# Patient Record
Sex: FEMALE | Race: OTHER | HISPANIC OR LATINO | URBAN - METROPOLITAN AREA
[De-identification: names, ages, dates, MRNs, and addresses within clinical notes are randomized per-mention and may not be internally consistent; named-entity substitution may affect disease eponyms.]

---

## 2017-01-29 ENCOUNTER — EMERGENCY (EMERGENCY)
Facility: HOSPITAL | Age: 27
LOS: 1 days | Discharge: HOME | End: 2017-01-29
Attending: EMERGENCY MEDICINE
Payer: COMMERCIAL

## 2017-01-29 VITALS
RESPIRATION RATE: 16 BRPM | DIASTOLIC BLOOD PRESSURE: 71 MMHG | HEART RATE: 65 BPM | TEMPERATURE: 98 F | OXYGEN SATURATION: 98 % | SYSTOLIC BLOOD PRESSURE: 107 MMHG

## 2017-01-29 VITALS
HEART RATE: 86 BPM | OXYGEN SATURATION: 100 % | RESPIRATION RATE: 16 BRPM | DIASTOLIC BLOOD PRESSURE: 71 MMHG | HEIGHT: 65 IN | WEIGHT: 220.02 LBS | TEMPERATURE: 98 F | SYSTOLIC BLOOD PRESSURE: 130 MMHG

## 2017-01-29 DIAGNOSIS — R07.89 OTHER CHEST PAIN: ICD-10-CM

## 2017-01-29 DIAGNOSIS — Z88.2 ALLERGY STATUS TO SULFONAMIDES: ICD-10-CM

## 2017-01-29 PROCEDURE — 93005 ELECTROCARDIOGRAM TRACING: CPT

## 2017-01-29 PROCEDURE — 99284 EMERGENCY DEPT VISIT MOD MDM: CPT

## 2017-01-29 PROCEDURE — 99283 EMERGENCY DEPT VISIT LOW MDM: CPT

## 2017-01-29 RX ADMIN — Medication 1 MILLIGRAM(S): at 19:39

## 2017-01-29 NOTE — ED ADULT NURSE NOTE - OBJECTIVE STATEMENT
received pt from triage with c/o chest tightness. pt is a/o x 3, cooperative. Denies SOB. Awaiting to be seen by MD. will continue to monitor.

## 2017-01-29 NOTE — ED PROVIDER NOTE - MEDICAL DECISION MAKING DETAILS
25 y/o F pt p/w anxiety, chest tightness. Plan: give small dose of Ativan and reevaluate. May be worsening of anxiety due to withdrawal from alcohol/caffeine/smoking. 25 y/o F pt p/w anxiety, chest tightness. Plan: give small dose of Ativan and reevaluate. May be worsening of anxiety due to withdrawal from alcohol/caffeine/smoking.  pt has fu appt to talk about going back on ssri. ekg normal  feels better after ativan

## 2017-01-29 NOTE — ED PROVIDER NOTE - NS ED MD SCRIBE ATTENDING SCRIBE SECTIONS
DISPOSITION/PAST MEDICAL/SURGICAL/SOCIAL HISTORY/VITAL SIGNS( Pullset)/HISTORY OF PRESENT ILLNESS/PHYSICAL EXAM/HIV/REVIEW OF SYSTEMS VITAL SIGNS( Pullset)/RESULTS/REVIEW OF SYSTEMS/HIV/DISPOSITION/PAST MEDICAL/SURGICAL/SOCIAL HISTORY/HISTORY OF PRESENT ILLNESS/PHYSICAL EXAM

## 2017-01-29 NOTE — ED PROVIDER NOTE - CHPI ED SYMPTOMS NEG
no fever, no chills, no shortness of breath, no cough, no numbness, no tingling, no dizziness, no headache, no diaphoresis/no suicidal/no hallucinations/no homicidal

## 2018-07-06 NOTE — ED ADULT TRIAGE NOTE - ARRIVAL FROM
07/04/18 0942   Rehab Time/Intention   Evaluation Not Performed other (see comments)  (Per RN, hold PT eval until tomorrow - pt too painful and nauseous at this time.)   Rehab Treatment   Discipline physical therapist   Recommendation   PT - Next Appointment 07/05/18     
   07/06/18 1523   Rehab Treatment   Discipline physical therapist   Reason Treatment Not Performed other (see comments)  (Observed pt. ambulate in hallway around nursing station x 2 this PM with family (use of I.V. pole for stability). Pt. now back in room and at rest. Will follow up over weekend for any further questions/concerns regarding functional mobility.)   Recommendation   PT - Next Appointment 07/08/18     
Heart rhythm change on monitor. Appeared a-fib, ordered STAT EKG per protocol. EKG Showed possible infarct, ordered STAT labs. Spoke with Dr. Carreon 2031 in regards to XR results, stated she would be up to see patient shortly.  
Home

## 2018-11-28 ENCOUNTER — APPOINTMENT (OUTPATIENT)
Dept: DERMATOLOGY | Facility: CLINIC | Age: 28
End: 2018-11-28

## 2018-11-29 ENCOUNTER — APPOINTMENT (OUTPATIENT)
Dept: DERMATOLOGY | Facility: CLINIC | Age: 28
End: 2018-11-29

## 2020-09-27 ENCOUNTER — EMERGENCY (EMERGENCY)
Facility: HOSPITAL | Age: 30
LOS: 1 days | Discharge: HOME | End: 2020-09-27
Attending: EMERGENCY MEDICINE | Admitting: EMERGENCY MEDICINE
Payer: COMMERCIAL

## 2020-09-27 VITALS
HEIGHT: 65 IN | HEART RATE: 78 BPM | OXYGEN SATURATION: 95 % | RESPIRATION RATE: 18 BRPM | DIASTOLIC BLOOD PRESSURE: 84 MMHG | SYSTOLIC BLOOD PRESSURE: 120 MMHG | TEMPERATURE: 98 F | WEIGHT: 293 LBS

## 2020-09-27 VITALS
SYSTOLIC BLOOD PRESSURE: 132 MMHG | DIASTOLIC BLOOD PRESSURE: 76 MMHG | TEMPERATURE: 98 F | RESPIRATION RATE: 18 BRPM | OXYGEN SATURATION: 98 % | HEART RATE: 72 BPM

## 2020-09-27 DIAGNOSIS — R10.31 RIGHT LOWER QUADRANT PAIN: ICD-10-CM

## 2020-09-27 DIAGNOSIS — M54.5 LOW BACK PAIN: ICD-10-CM

## 2020-09-27 DIAGNOSIS — R10.30 LOWER ABDOMINAL PAIN, UNSPECIFIED: ICD-10-CM

## 2020-09-27 DIAGNOSIS — Z88.2 ALLERGY STATUS TO SULFONAMIDES: ICD-10-CM

## 2020-09-27 LAB
ALBUMIN SERPL ELPH-MCNC: 4.2 G/DL — SIGNIFICANT CHANGE UP (ref 3.3–5)
ALP SERPL-CCNC: 89 U/L — SIGNIFICANT CHANGE UP (ref 40–120)
ALT FLD-CCNC: 41 U/L — SIGNIFICANT CHANGE UP (ref 10–45)
ANION GAP SERPL CALC-SCNC: 11 MMOL/L — SIGNIFICANT CHANGE UP (ref 5–17)
APPEARANCE UR: CLEAR — SIGNIFICANT CHANGE UP
AST SERPL-CCNC: 22 U/L — SIGNIFICANT CHANGE UP (ref 10–40)
BASOPHILS # BLD AUTO: 0.04 K/UL — SIGNIFICANT CHANGE UP (ref 0–0.2)
BASOPHILS NFR BLD AUTO: 0.5 % — SIGNIFICANT CHANGE UP (ref 0–2)
BILIRUB SERPL-MCNC: 0.3 MG/DL — SIGNIFICANT CHANGE UP (ref 0.2–1.2)
BILIRUB UR-MCNC: NEGATIVE — SIGNIFICANT CHANGE UP
BUN SERPL-MCNC: 10 MG/DL — SIGNIFICANT CHANGE UP (ref 7–23)
CALCIUM SERPL-MCNC: 9.5 MG/DL — SIGNIFICANT CHANGE UP (ref 8.4–10.5)
CHLORIDE SERPL-SCNC: 100 MMOL/L — SIGNIFICANT CHANGE UP (ref 96–108)
CO2 SERPL-SCNC: 26 MMOL/L — SIGNIFICANT CHANGE UP (ref 22–31)
COLOR SPEC: YELLOW — SIGNIFICANT CHANGE UP
CREAT SERPL-MCNC: 0.69 MG/DL — SIGNIFICANT CHANGE UP (ref 0.5–1.3)
DIFF PNL FLD: NEGATIVE — SIGNIFICANT CHANGE UP
EOSINOPHIL # BLD AUTO: 0.09 K/UL — SIGNIFICANT CHANGE UP (ref 0–0.5)
EOSINOPHIL NFR BLD AUTO: 1 % — SIGNIFICANT CHANGE UP (ref 0–6)
GLUCOSE SERPL-MCNC: 96 MG/DL — SIGNIFICANT CHANGE UP (ref 70–99)
GLUCOSE UR QL: NEGATIVE — SIGNIFICANT CHANGE UP
HCT VFR BLD CALC: 40.3 % — SIGNIFICANT CHANGE UP (ref 34.5–45)
HGB BLD-MCNC: 12.8 G/DL — SIGNIFICANT CHANGE UP (ref 11.5–15.5)
IMM GRANULOCYTES NFR BLD AUTO: 0.3 % — SIGNIFICANT CHANGE UP (ref 0–1.5)
KETONES UR-MCNC: NEGATIVE — SIGNIFICANT CHANGE UP
LEUKOCYTE ESTERASE UR-ACNC: NEGATIVE — SIGNIFICANT CHANGE UP
LYMPHOCYTES # BLD AUTO: 1.65 K/UL — SIGNIFICANT CHANGE UP (ref 1–3.3)
LYMPHOCYTES # BLD AUTO: 19.2 % — SIGNIFICANT CHANGE UP (ref 13–44)
MCHC RBC-ENTMCNC: 27.5 PG — SIGNIFICANT CHANGE UP (ref 27–34)
MCHC RBC-ENTMCNC: 31.8 GM/DL — LOW (ref 32–36)
MCV RBC AUTO: 86.5 FL — SIGNIFICANT CHANGE UP (ref 80–100)
MONOCYTES # BLD AUTO: 0.58 K/UL — SIGNIFICANT CHANGE UP (ref 0–0.9)
MONOCYTES NFR BLD AUTO: 6.7 % — SIGNIFICANT CHANGE UP (ref 2–14)
NEUTROPHILS # BLD AUTO: 6.22 K/UL — SIGNIFICANT CHANGE UP (ref 1.8–7.4)
NEUTROPHILS NFR BLD AUTO: 72.3 % — SIGNIFICANT CHANGE UP (ref 43–77)
NITRITE UR-MCNC: NEGATIVE — SIGNIFICANT CHANGE UP
NRBC # BLD: 0 /100 WBCS — SIGNIFICANT CHANGE UP (ref 0–0)
PH UR: 6 — SIGNIFICANT CHANGE UP (ref 5–8)
PLATELET # BLD AUTO: 249 K/UL — SIGNIFICANT CHANGE UP (ref 150–400)
POTASSIUM SERPL-MCNC: 4.1 MMOL/L — SIGNIFICANT CHANGE UP (ref 3.5–5.3)
POTASSIUM SERPL-SCNC: 4.1 MMOL/L — SIGNIFICANT CHANGE UP (ref 3.5–5.3)
PROT SERPL-MCNC: 7.2 G/DL — SIGNIFICANT CHANGE UP (ref 6–8.3)
PROT UR-MCNC: NEGATIVE MG/DL — SIGNIFICANT CHANGE UP
RBC # BLD: 4.66 M/UL — SIGNIFICANT CHANGE UP (ref 3.8–5.2)
RBC # FLD: 13.7 % — SIGNIFICANT CHANGE UP (ref 10.3–14.5)
SODIUM SERPL-SCNC: 137 MMOL/L — SIGNIFICANT CHANGE UP (ref 135–145)
SP GR SPEC: 1.02 — SIGNIFICANT CHANGE UP (ref 1–1.03)
UROBILINOGEN FLD QL: 0.2 E.U./DL — SIGNIFICANT CHANGE UP
WBC # BLD: 8.61 K/UL — SIGNIFICANT CHANGE UP (ref 3.8–10.5)
WBC # FLD AUTO: 8.61 K/UL — SIGNIFICANT CHANGE UP (ref 3.8–10.5)

## 2020-09-27 PROCEDURE — 85025 COMPLETE CBC W/AUTO DIFF WBC: CPT

## 2020-09-27 PROCEDURE — 96374 THER/PROPH/DIAG INJ IV PUSH: CPT

## 2020-09-27 PROCEDURE — 87086 URINE CULTURE/COLONY COUNT: CPT

## 2020-09-27 PROCEDURE — 76856 US EXAM PELVIC COMPLETE: CPT | Mod: 26

## 2020-09-27 PROCEDURE — 81003 URINALYSIS AUTO W/O SCOPE: CPT

## 2020-09-27 PROCEDURE — 99285 EMERGENCY DEPT VISIT HI MDM: CPT

## 2020-09-27 PROCEDURE — 76830 TRANSVAGINAL US NON-OB: CPT

## 2020-09-27 PROCEDURE — 80053 COMPREHEN METABOLIC PANEL: CPT

## 2020-09-27 PROCEDURE — 36415 COLL VENOUS BLD VENIPUNCTURE: CPT

## 2020-09-27 PROCEDURE — 76830 TRANSVAGINAL US NON-OB: CPT | Mod: 26

## 2020-09-27 PROCEDURE — 76856 US EXAM PELVIC COMPLETE: CPT

## 2020-09-27 PROCEDURE — 99284 EMERGENCY DEPT VISIT MOD MDM: CPT | Mod: 25

## 2020-09-27 RX ORDER — KETOROLAC TROMETHAMINE 30 MG/ML
15 SYRINGE (ML) INJECTION ONCE
Refills: 0 | Status: DISCONTINUED | OUTPATIENT
Start: 2020-09-27 | End: 2020-09-27

## 2020-09-27 RX ORDER — CYCLOBENZAPRINE HYDROCHLORIDE 10 MG/1
1 TABLET, FILM COATED ORAL
Qty: 15 | Refills: 0
Start: 2020-09-27 | End: 2020-10-01

## 2020-09-27 RX ORDER — IBUPROFEN 200 MG
1 TABLET ORAL
Qty: 15 | Refills: 0
Start: 2020-09-27 | End: 2020-10-01

## 2020-09-27 RX ADMIN — Medication 15 MILLIGRAM(S): at 18:21

## 2020-09-27 RX ADMIN — Medication 15 MILLIGRAM(S): at 17:41

## 2020-09-27 NOTE — ED ADULT NURSE NOTE - OBJECTIVE STATEMENT
Patient c/o of RLQ abdominal pain, pressure and pulling,  X 1 wk ,radiates to right flank, no nausea/vomitting/diarrhea, denies vaginal bleed/discharge.

## 2020-09-27 NOTE — ED PROVIDER NOTE - NSFOLLOWUPINSTRUCTIONS_ED_ALL_ED_FT
Take tylenol 650mg or motrin 400-800mg as needed every 4-6 hours for pain. Take flexeril to relax muscles and apply heat.  Follow up with primary doctor within one week.  Return to ED if you experience fever, increased pain in abdomen or back, vomiting, numbness/weakness in extremities, dizziness or other concerns     Abdominal Pain    Many things can cause abdominal pain. Many times, abdominal pain is not caused by a disease and will improve without treatment. Your health care provider will do a physical exam to determine if there is a dangerous cause of your pain; blood tests and imaging may help determine the cause of your pain. However, in many cases, no cause may be found and you may need further testing as an outpatient. Monitor your abdominal pain for any changes.     SEEK IMMEDIATE MEDICAL CARE IF YOU HAVE ANY OF THE FOLLOWING SYMPTOMS: worsening abdominal pain, uncontrollable vomiting, profuse diarrhea, inability to have bowel movements or pass gas, black or bloody stools, fever accompanying chest pain or back pain, or fainting. These symptoms may represent a serious problem that is an emergency. Do not wait to see if the symptoms will go away. Get medical help right away. Call 911 and do not drive yourself to the hospital.    Back Pain    Back pain is very common in adults. The cause of back pain is rarely dangerous and the pain often gets better over time. The cause of your back pain may not be known and may include strain of muscles or ligaments, degeneration of the spinal disks, or arthritis. Occasionally the pain may radiate down your leg(s). Over-the-counter medicines to reduce pain and inflammation are often the most helpful. Stretching and remaining active frequently helps the healing process.     SEEK IMMEDIATE MEDICAL CARE IF YOU HAVE ANY OF THE FOLLOWING SYMPTOMS: bowel or bladder control problems, unusual weakness or numbness in your arms or legs, nausea or vomiting, abdominal pain, fever, dizziness/lightheadedness.

## 2020-09-27 NOTE — ED PROVIDER NOTE - CLINICAL SUMMARY MEDICAL DECISION MAKING FREE TEXT BOX
29 healthy F p/w R lower abd/pelvic pain w/ radiation to R groin and back x one week- no urinary sxs, vag bleed/dc or NVD; limited exam 2/2 habitus but no point abd or pelvic ttp, no cvat, no midline back or paraspinal ttp;  possible msk pain, will r/o ovarian/pelvic pathology and uti.  do not suspect appy 29 healthy F p/w R lower abd/pelvic pain w/ radiation to R groin and lower back x one week- no urinary sxs, vag bleed/dc or NVD; limited exam 2/2 habitus but no point abd or pelvic ttp, no vaginal dc, no cvat, no midline back or paraspinal ttp;  likely msk back/groin pain, will r/o ovarian/pelvic pathology and uti.  do not suspect appy.  will obtain labs, UA, sonogram

## 2020-09-27 NOTE — ED PROVIDER NOTE - PHYSICAL EXAMINATION
Vitals reviewed  Gen: obese F, well appearing, nad, speaking in full sentences  Skin: wwp, no rash/lesions  HEENT: ncat, eomi, mmm  Back: no midline or paraspinal ttp   CV: rrr, no audible m/r/g  Resp: symmetrical expansion, ctab, no w/r/r  Abd: limited 2/2 habitus; obese, nondistended, soft, no ttp/rebound/guarding, no cvat  Pelvic: limited 2/2 habitus; no dc or bleeding, no noted cmt or adnexal mass/ttp  Ext: FROM throughout, no peripheral edema  Neuro: alert/oriented, no focal deficits, steady gait

## 2020-09-27 NOTE — ED ADULT NURSE NOTE - CHPI ED NUR SYMPTOMS NEG
no vomiting/no abdominal distension/no burning urination/no dysuria/no nausea/no hematuria/no blood in stool/no fever/no diarrhea

## 2020-09-27 NOTE — ED PROVIDER NOTE - PATIENT PORTAL LINK FT
You can access the FollowMyHealth Patient Portal offered by [facility] by registering at the following website: [email or web address]. By joining [employer]’s FollowMyHealth portal, you will also be able to view your health information using other applications (apps) compatible with our system.

## 2020-09-27 NOTE — ED PROVIDER NOTE - ATTENDING CONTRIBUTION TO CARE
28 yo obese F p/w abdominal and back pain x one week. Pt reports constant R pelvic pressure with episodic increased sharp pain which radiates to R lower back and R groin; worse with movement that started after she lifted some heavy boxes. Pain increased today when she stood up. She also c/o occasional nausea- no vomiting, normal BMs.  Had telehealth visit and was told potentially ovarian cyst vs appendicitis vs renal stone.  Denies f/c, headache, dizziness, fainting, chest pain, sob, diarrhea, constipation, dysuria/hematuria, vaginal bleeding/discharge, h/o STI, trauma/fall, numbness/weakness, bowel/bladder dysfunction, saddle paresthesia. Pt AO, NAD, abd: obese, soft and NT. Pelvic exam per PA exam. No cvat, no midline back or paraspinal ttp;  likely msk back/groin pain. Labs/ pelvic u/s and UA with no acute findings. Pain meds prn and pt to f/up outpt. Return precautions given. 30 yo obese F p/w abdominal and back pain x one week. Pt reports constant R pelvic pressure with episodic increased sharp pain which radiates to R lower back and R groin; worse with movement that started after she lifted some heavy boxes. Pain increased today when she stood up. She also c/o occasional nausea- no vomiting, normal BMs.  Had telehealth visit and was told potentially ovarian cyst vs appendicitis vs renal stone. Denies f/c, headache, dizziness, fainting, chest pain, sob, diarrhea, constipation, dysuria/hematuria, vaginal bleeding/discharge, h/o STI, trauma/fall, numbness/weakness, bowel/bladder dysfunction, saddle paresthesia. Pt AO, NAD, abd: obese, soft and NT. Pelvic exam per PA exam. No cvat, no midline back or paraspinal ttp. Labs/ pelvic u/s and UA with no acute findings. Sxs likely sec to msk back and groin pain. Pain meds prn and pt to f/up outpt. Return precautions given.

## 2020-09-27 NOTE — ED ADULT NURSE REASSESSMENT NOTE - NS ED NURSE REASSESS COMMENT FT1
Patient a/oX 3, anxious, c/o of RLQ abdominal pain radiates to right flank, no vaginal bleed/discharge, no dysuria.  Vital signs stble.  Left AC PIV #20 in place, all labs sent,no complictions.  Pain improved /sp Toradol 15mg IVP.  Brought to US in stble condition.

## 2020-09-27 NOTE — ED PROVIDER NOTE - OBJECTIVE STATEMENT
29 healthy F p/w abd and back pain x one week.  Pt reports constant R pelvic pressure with episodic increased sharp pain which radiates to lower back; worse with movement. 29 healthy F p/w abd and back pain x one week.  Pt reports constant R pelvic pressure with episodic increased sharp pain which radiates to lower back and R groin; worse with movement.  Pain increased today when she stood up.  She also c/o occasional nausea- no vomiting, normal BMs.  Had telehealth visit and was told potentially ovarian cyst vs appendicitis vs renal stone.  Denies f/c, headache, dizziness, fainting, chest pain, sob, diarrhea, constipation, dysuria/hematuria, 29 healthy F p/w abd and back pain x one week.  Pt reports constant R pelvic pressure with episodic increased sharp pain which radiates to R lower back and R groin; worse with movement.  Pain increased today when she stood up.  She also c/o occasional nausea- no vomiting, normal BMs.  Had telehealth visit and was told potentially ovarian cyst vs appendicitis vs renal stone.  Denies f/c, headache, dizziness, fainting, chest pain, sob, diarrhea, constipation, dysuria/hematuria, vaginal bleeding/discharge, h/o STI, trauma/fall, numbness/weakness, bowel/bladder dysfunction, saddle paresthesia.

## 2020-09-27 NOTE — ED ADULT TRIAGE NOTE - CHIEF COMPLAINT QUOTE
PT presents today w/ c/o R sided lower back pain radiating to abdomen. Denies fever/chills/n/v, denies painful urination.

## 2020-09-27 NOTE — ED ADULT NURSE REASSESSMENT NOTE - NS ED NURSE REASSESS COMMENT FT1
Patient back from US, results pending. RLQ abdominal and flank pain improved s/p meds.  Vital signs stable.  Results and disposition pending.

## 2020-09-28 LAB
CULTURE RESULTS: SIGNIFICANT CHANGE UP
SPECIMEN SOURCE: SIGNIFICANT CHANGE UP

## 2021-02-28 NOTE — ED PROVIDER NOTE - NS ED ATTENDING STATEMENT MOD
Procedure:  CYSTOSCOPY URETEROSCOPY WITH LITHOTRIPSY HOLMIUM LASER, RETROGRADE PYELOGRAM AND INSERTION STENT URETERAL (Left Bladder)    Relevant Problems   GI/HEPATIC   (+) Gastroesophageal reflux disease   (+) Rectal bleed      /RENAL   (+) Nephrolithiasis      NEURO/PSYCH   (+) Generalized anxiety disorder   (+) Severe episode of recurrent major depressive disorder, without psychotic features ([facility] [address] )      Other   (+) Suicidal ideations        Physical Exam    Airway    Mallampati score: III  TM Distance: >3 FB  Neck ROM: full     Dental       Cardiovascular  Rhythm: regular, Rate: normal,     Pulmonary  Breath sounds clear to auscultation,     Other Findings        Anesthesia Plan  ASA Score- 2 Emergent    Anesthesia Type- general with ASA Monitors  Additional Monitors:   Airway Plan: LMA  Plan Factors-    Chart reviewed  Patient is not a current smoker  Induction- intravenous  Postoperative Plan- Plan for postoperative opioid use  Informed Consent- Anesthetic plan and risks discussed with patient  I have personally performed a face to face diagnostic evaluation on this patient. I have reviewed the ACP note and agree with the history, exam and plan of care, except as noted.

## 2021-03-11 NOTE — ED ADULT NURSE NOTE - CAS TRG GEN SKIN CONDITION
Received. PCP signed. Faxed to [employer], confirmation received. Patient called to  a copy with ID.      Copy placed in drawer Warm

## 2021-03-21 ENCOUNTER — APPOINTMENT (OUTPATIENT)
Age: 31
End: 2021-03-21

## 2021-05-09 ENCOUNTER — EMERGENCY (EMERGENCY)
Facility: HOSPITAL | Age: 31
LOS: 1 days | Discharge: HOME | End: 2021-05-09
Attending: EMERGENCY MEDICINE
Payer: COMMERCIAL

## 2021-05-09 VITALS
OXYGEN SATURATION: 97 % | HEIGHT: 65 IN | RESPIRATION RATE: 16 BRPM | HEART RATE: 69 BPM | WEIGHT: 293 LBS | DIASTOLIC BLOOD PRESSURE: 87 MMHG | SYSTOLIC BLOOD PRESSURE: 135 MMHG | TEMPERATURE: 98 F

## 2021-05-09 LAB
ALBUMIN SERPL ELPH-MCNC: 3.6 G/DL — SIGNIFICANT CHANGE UP (ref 3.5–5)
ALP SERPL-CCNC: 95 U/L — SIGNIFICANT CHANGE UP (ref 40–120)
ALT FLD-CCNC: 70 U/L DA — HIGH (ref 10–60)
ANION GAP SERPL CALC-SCNC: 4 MMOL/L — LOW (ref 5–17)
APPEARANCE UR: CLEAR — SIGNIFICANT CHANGE UP
AST SERPL-CCNC: 32 U/L — SIGNIFICANT CHANGE UP (ref 10–40)
BACTERIA # UR AUTO: ABNORMAL /HPF
BASOPHILS # BLD AUTO: 0.05 K/UL — SIGNIFICANT CHANGE UP (ref 0–0.2)
BASOPHILS NFR BLD AUTO: 0.7 % — SIGNIFICANT CHANGE UP (ref 0–2)
BILIRUB SERPL-MCNC: 0.4 MG/DL — SIGNIFICANT CHANGE UP (ref 0.2–1.2)
BILIRUB UR-MCNC: NEGATIVE — SIGNIFICANT CHANGE UP
BUN SERPL-MCNC: 9 MG/DL — SIGNIFICANT CHANGE UP (ref 7–18)
CALCIUM SERPL-MCNC: 8.6 MG/DL — SIGNIFICANT CHANGE UP (ref 8.4–10.5)
CHLORIDE SERPL-SCNC: 108 MMOL/L — SIGNIFICANT CHANGE UP (ref 96–108)
CO2 SERPL-SCNC: 28 MMOL/L — SIGNIFICANT CHANGE UP (ref 22–31)
COLOR SPEC: YELLOW — SIGNIFICANT CHANGE UP
CREAT SERPL-MCNC: 0.73 MG/DL — SIGNIFICANT CHANGE UP (ref 0.5–1.3)
DIFF PNL FLD: ABNORMAL
EOSINOPHIL # BLD AUTO: 0.07 K/UL — SIGNIFICANT CHANGE UP (ref 0–0.5)
EOSINOPHIL NFR BLD AUTO: 0.9 % — SIGNIFICANT CHANGE UP (ref 0–6)
EPI CELLS # UR: SIGNIFICANT CHANGE UP /HPF
GLUCOSE SERPL-MCNC: 85 MG/DL — SIGNIFICANT CHANGE UP (ref 70–99)
GLUCOSE UR QL: NEGATIVE — SIGNIFICANT CHANGE UP
HCG SERPL-ACNC: <1 MIU/ML — SIGNIFICANT CHANGE UP
HCT VFR BLD CALC: 40.3 % — SIGNIFICANT CHANGE UP (ref 34.5–45)
HGB BLD-MCNC: 12.9 G/DL — SIGNIFICANT CHANGE UP (ref 11.5–15.5)
IMM GRANULOCYTES NFR BLD AUTO: 0.4 % — SIGNIFICANT CHANGE UP (ref 0–1.5)
KETONES UR-MCNC: NEGATIVE — SIGNIFICANT CHANGE UP
LACTATE SERPL-SCNC: 1.4 MMOL/L — SIGNIFICANT CHANGE UP (ref 0.7–2)
LEUKOCYTE ESTERASE UR-ACNC: NEGATIVE — SIGNIFICANT CHANGE UP
LIDOCAIN IGE QN: 79 U/L — SIGNIFICANT CHANGE UP (ref 73–393)
LYMPHOCYTES # BLD AUTO: 1.77 K/UL — SIGNIFICANT CHANGE UP (ref 1–3.3)
LYMPHOCYTES # BLD AUTO: 23.6 % — SIGNIFICANT CHANGE UP (ref 13–44)
MCHC RBC-ENTMCNC: 27.3 PG — SIGNIFICANT CHANGE UP (ref 27–34)
MCHC RBC-ENTMCNC: 32 GM/DL — SIGNIFICANT CHANGE UP (ref 32–36)
MCV RBC AUTO: 85.2 FL — SIGNIFICANT CHANGE UP (ref 80–100)
MONOCYTES # BLD AUTO: 0.48 K/UL — SIGNIFICANT CHANGE UP (ref 0–0.9)
MONOCYTES NFR BLD AUTO: 6.4 % — SIGNIFICANT CHANGE UP (ref 2–14)
NEUTROPHILS # BLD AUTO: 5.09 K/UL — SIGNIFICANT CHANGE UP (ref 1.8–7.4)
NEUTROPHILS NFR BLD AUTO: 68 % — SIGNIFICANT CHANGE UP (ref 43–77)
NITRITE UR-MCNC: NEGATIVE — SIGNIFICANT CHANGE UP
NRBC # BLD: 0 /100 WBCS — SIGNIFICANT CHANGE UP (ref 0–0)
PH UR: 5 — SIGNIFICANT CHANGE UP (ref 5–8)
PLATELET # BLD AUTO: 264 K/UL — SIGNIFICANT CHANGE UP (ref 150–400)
POTASSIUM SERPL-MCNC: 3.9 MMOL/L — SIGNIFICANT CHANGE UP (ref 3.5–5.3)
POTASSIUM SERPL-SCNC: 3.9 MMOL/L — SIGNIFICANT CHANGE UP (ref 3.5–5.3)
PROT SERPL-MCNC: 7.4 G/DL — SIGNIFICANT CHANGE UP (ref 6–8.3)
PROT UR-MCNC: NEGATIVE — SIGNIFICANT CHANGE UP
RBC # BLD: 4.73 M/UL — SIGNIFICANT CHANGE UP (ref 3.8–5.2)
RBC # FLD: 13.3 % — SIGNIFICANT CHANGE UP (ref 10.3–14.5)
RBC CASTS # UR COMP ASSIST: SIGNIFICANT CHANGE UP /HPF (ref 0–2)
SODIUM SERPL-SCNC: 140 MMOL/L — SIGNIFICANT CHANGE UP (ref 135–145)
SP GR SPEC: 1.02 — SIGNIFICANT CHANGE UP (ref 1.01–1.02)
UROBILINOGEN FLD QL: NEGATIVE — SIGNIFICANT CHANGE UP
WBC # BLD: 7.49 K/UL — SIGNIFICANT CHANGE UP (ref 3.8–10.5)
WBC # FLD AUTO: 7.49 K/UL — SIGNIFICANT CHANGE UP (ref 3.8–10.5)
WBC UR QL: SIGNIFICANT CHANGE UP /HPF (ref 0–5)

## 2021-05-09 PROCEDURE — 81001 URINALYSIS AUTO W/SCOPE: CPT

## 2021-05-09 PROCEDURE — 84702 CHORIONIC GONADOTROPIN TEST: CPT

## 2021-05-09 PROCEDURE — 96374 THER/PROPH/DIAG INJ IV PUSH: CPT

## 2021-05-09 PROCEDURE — 85025 COMPLETE CBC W/AUTO DIFF WBC: CPT

## 2021-05-09 PROCEDURE — 80053 COMPREHEN METABOLIC PANEL: CPT

## 2021-05-09 PROCEDURE — 83690 ASSAY OF LIPASE: CPT

## 2021-05-09 PROCEDURE — 99284 EMERGENCY DEPT VISIT MOD MDM: CPT | Mod: 25

## 2021-05-09 PROCEDURE — 99284 EMERGENCY DEPT VISIT MOD MDM: CPT

## 2021-05-09 PROCEDURE — 96361 HYDRATE IV INFUSION ADD-ON: CPT

## 2021-05-09 PROCEDURE — 83605 ASSAY OF LACTIC ACID: CPT

## 2021-05-09 PROCEDURE — 36415 COLL VENOUS BLD VENIPUNCTURE: CPT

## 2021-05-09 RX ORDER — AZITHROMYCIN 500 MG/1
500 TABLET, FILM COATED ORAL ONCE
Refills: 0 | Status: COMPLETED | OUTPATIENT
Start: 2021-05-09 | End: 2021-05-09

## 2021-05-09 RX ORDER — SODIUM CHLORIDE 9 MG/ML
1000 INJECTION INTRAMUSCULAR; INTRAVENOUS; SUBCUTANEOUS ONCE
Refills: 0 | Status: COMPLETED | OUTPATIENT
Start: 2021-05-09 | End: 2021-05-09

## 2021-05-09 RX ORDER — AZITHROMYCIN 500 MG/1
1 TABLET, FILM COATED ORAL
Qty: 4 | Refills: 0
Start: 2021-05-09 | End: 2021-05-12

## 2021-05-09 RX ORDER — FAMOTIDINE 10 MG/ML
20 INJECTION INTRAVENOUS ONCE
Refills: 0 | Status: COMPLETED | OUTPATIENT
Start: 2021-05-09 | End: 2021-05-09

## 2021-05-09 RX ADMIN — FAMOTIDINE 20 MILLIGRAM(S): 10 INJECTION INTRAVENOUS at 19:51

## 2021-05-09 RX ADMIN — SODIUM CHLORIDE 1000 MILLILITER(S): 9 INJECTION INTRAMUSCULAR; INTRAVENOUS; SUBCUTANEOUS at 19:51

## 2021-05-09 RX ADMIN — SODIUM CHLORIDE 1000 MILLILITER(S): 9 INJECTION INTRAMUSCULAR; INTRAVENOUS; SUBCUTANEOUS at 20:50

## 2021-05-09 RX ADMIN — AZITHROMYCIN 500 MILLIGRAM(S): 500 TABLET, FILM COATED ORAL at 19:51

## 2021-05-09 NOTE — ED ADULT NURSE NOTE - ISOLATION TYPE:
General: WN/WD, mild respiratory distress   Neurology: A&Ox3, nonfocal, VILLA x 4  Respiratory: CTA B/L, no wheezing   CV: RRR, S1S2, no murmurs, +JVD  Abdominal: Soft, NTND, normal active BS  Extremities: No edema, + peripheral pulses
None

## 2021-05-09 NOTE — ED PROVIDER NOTE - PROGRESS NOTE DETAILS
labs stable.  Patient nontoxic and medically stable for discharge. Results as applicable discussed with patient. Return precautions provided and patient understands to return to the ED for concerning or worsening signs and symptoms. Instructed to follow up with primary care physician and agreeable. Patient's questions answered.

## 2021-05-09 NOTE — ED PROVIDER NOTE - CONSTITUTIONAL, MLM
normal... initiate skin to skin/initiate hand expression routine Well appearing, awake, alert, oriented to person, place, time/situation and in no apparent distress.

## 2021-05-09 NOTE — ED PROVIDER NOTE - ATTENDING CONTRIBUTION TO CARE
seen with acp  hx of travelers diarhea worked up at [facility] care [place] darien culture is pending  labs done at [facility] showed neutrophilia  abdomen soft non tender bs active  labs drawn here are wnl  agree with acps assessment hx and physical and disposition

## 2021-05-09 NOTE — ED PROVIDER NOTE - NONTENDER LOCATION
right upper quadrant/left lower quadrant/right lower quadrant/left costovertebral angle/right costovertebral angle

## 2021-05-09 NOTE — ED PROVIDER NOTE - CLINICAL SUMMARY MEDICAL DECISION MAKING FREE TEXT BOX
30 Y.O female with gastroenteritis / travelers diarrhea, will repeat labs, provide IV hydration and start antibiotics

## 2021-05-09 NOTE — ED ADULT NURSE NOTE - OBJECTIVE STATEMENT
As per pt, c/o ABD pain w/ n/v and lower back pain since 4/28/2021 s/p recent travel to [place] and returned to the states on 4/28/2021. Pt denies h/a, SOB, CP, f/c, diarrhea, or cough.

## 2021-05-09 NOTE — ED PROVIDER NOTE - OBJECTIVE STATEMENT
30 y.o female with no PMhx , in the ED with gastroenteritis . Patient endorses she was in [place] on April 28th 2021, and then starting on April 29th 2021 started to have nausea, vomiting, diarrhea, and abdominal cramping. Patient went to urgent care on April 29th 2021, and had blood culture and stool culture done , which is still pending. Patient states since then she has been afebrile and nausea and vomiting have resolved, but patient still has nonbloody diarrhea and abdominal pain. patient denies any chest pain , shortness of breath or any other acute complaints. NKDA 30 y.o female with no PMhx , in the ED with gastroenteritis . Patient endorses she was in [place] on April 28th 2021, and then starting on April 29th 2021 started to have nausea, vomiting, diarrhea, and abdominal cramping. Patient went to urgent care on April 29th 2021, and had blood culture and stool culture done , which is still pending. Patient states since then she has been afebrile and nausea and vomiting have resolved, and stool has transitioned from watery to soft but patient still has abd cramping and soft stool.  Patient denies any chest pain , shortness of breath or any other acute complaints. NKDA

## 2021-05-09 NOTE — ED PROVIDER NOTE - PATIENT PORTAL LINK FT
You can access the FollowMyHealth Patient Portal offered by [facility] by registering at the following website: [email or web address]. By joining [employer]’s FollowMyHealth portal, you will also be able to view your health information using other applications (apps) compatible with our system.

## 2022-08-05 ENCOUNTER — EMERGENCY (EMERGENCY)
Facility: HOSPITAL | Age: 32
LOS: 1 days | Discharge: HOME | End: 2022-08-05
Admitting: EMERGENCY MEDICINE
Payer: COMMERCIAL

## 2022-08-05 VITALS
SYSTOLIC BLOOD PRESSURE: 145 MMHG | HEIGHT: 65 IN | HEART RATE: 92 BPM | DIASTOLIC BLOOD PRESSURE: 89 MMHG | WEIGHT: 293 LBS | OXYGEN SATURATION: 97 % | RESPIRATION RATE: 17 BRPM | TEMPERATURE: 99 F

## 2022-08-05 VITALS
OXYGEN SATURATION: 99 % | DIASTOLIC BLOOD PRESSURE: 79 MMHG | TEMPERATURE: 98 F | SYSTOLIC BLOOD PRESSURE: 168 MMHG | RESPIRATION RATE: 16 BRPM | HEART RATE: 68 BPM

## 2022-08-05 LAB — SARS-COV-2 RNA SPEC QL NAA+PROBE: NEGATIVE — SIGNIFICANT CHANGE UP

## 2022-08-05 PROCEDURE — 73610 X-RAY EXAM OF ANKLE: CPT

## 2022-08-05 PROCEDURE — 73630 X-RAY EXAM OF FOOT: CPT | Mod: 26

## 2022-08-05 PROCEDURE — 99284 EMERGENCY DEPT VISIT MOD MDM: CPT | Mod: 25

## 2022-08-05 PROCEDURE — 73610 X-RAY EXAM OF ANKLE: CPT | Mod: 26

## 2022-08-05 PROCEDURE — 73630 X-RAY EXAM OF FOOT: CPT | Mod: 26,RT

## 2022-08-05 PROCEDURE — 73610 X-RAY EXAM OF ANKLE: CPT | Mod: 26,RT

## 2022-08-05 PROCEDURE — 73590 X-RAY EXAM OF LOWER LEG: CPT

## 2022-08-05 PROCEDURE — 87635 SARS-COV-2 COVID-19 AMP PRB: CPT

## 2022-08-05 PROCEDURE — 73590 X-RAY EXAM OF LOWER LEG: CPT | Mod: 26

## 2022-08-05 PROCEDURE — 73590 X-RAY EXAM OF LOWER LEG: CPT | Mod: 26,RT

## 2022-08-05 PROCEDURE — 73630 X-RAY EXAM OF FOOT: CPT

## 2022-08-05 NOTE — ED PROVIDER NOTE - OBJECTIVE STATEMENT
32 y/o F, no pertinent PMHx, now presenting to ED with R ankle pain s/p mechanical fall yesterday. Pt states she was out drinking last night when she fell. She is unable to recall how she fell, but denies any LOC. Pt denies any head injury or HA. Pt denies any hx of blood clots. She has swelling to her R leg. 30 y/o F with pmh of vasculitis, treated with steroids / followed by her pmd ,  now presenting to ED with R ankle pain s/p mechanical fall yesterday. Pt states she was out drinking last night when she fell. She is unable to recall how she fell, but denies any LOC, headstrike, headache . Pt denies any head injury or HA.  States most of the pain is to her ankle region.

## 2022-08-05 NOTE — ED PROVIDER NOTE - CLINICAL SUMMARY MEDICAL DECISION MAKING FREE TEXT BOX
30 y/o F, presenting to ED with R ankle pain s/p mechanical fall yesterday. Will order xray of R ankle to r/o fx. Will anticipate dc if xray is negative. Will discuss elevation and care instructions for injury. 30 y/o F, presenting to ED with R ankle pain s/p mechanical fall yesterday. Will order xray of R ankle to r/o fx. Will anticipate dc if xray is negative. Will discuss elevation and care instructions for injury.    pt had a runny nose last week requested a covid test / had a neg home one     x rays reviewed with rads neg    pt placed in ankle aircast  The patient understands the Emergency Department diagnosis is a preliminary diagnosis often based on limited information and that the patient must adhere to the follow-up plan as discussed.  At the time of discharge from the Emergency Department, the patient is alert with fluent appropriate speech and ambulatory without difficulty.  A medical screening examination was performed and no emergency medical condition was identified.

## 2022-08-05 NOTE — ED ADULT NURSE NOTE - OBJECTIVE STATEMENT
Pt presented to er with c/o Rt ankle pain after fall. Pt A&Ox4, ambulatory with steady gait, speaking in clear/full sentences, no acute distress, vital signs stable.

## 2022-08-05 NOTE — ED PROVIDER NOTE - MUSCULOSKELETAL, MLM
Spine appears normal, range of motion is not limited, no muscle or joint tenderness. Swelling to R ankle noted. Able to ambulate, but with a limp. Spine appears normal, range of motion is not limited, no muscle or joint tenderness. Swelling to R ankle noted. Able to ambulate, but with a limp, neg hernandez test of right ankle, + ttp to lateral malleolus

## 2022-08-05 NOTE — ED PROVIDER NOTE - PATIENT PORTAL LINK FT
You can access the FollowMyHealth Patient Portal offered by [facility] by registering at the following website: [email or web address]. By joining [employer]’s FollowMyHealth portal, you will also be able to view your health information using other applications (apps) compatible with our system.

## 2022-08-05 NOTE — ED PROVIDER NOTE - NSFOLLOWUPINSTRUCTIONS_ED_ALL_ED_FT
Please elevate, ice your ankle    wear air case    follow up with ortho if pain persists    return for worsening symtpoms                     Ankle Sprain    Illustration of an ankle sprain caused by a foot turning outward and a foot turning inward.    An ankle sprain is a stretch or tear in one of the tough tissues (ligaments) that connect the bones in your ankle. An ankle sprain can happen when the ankle rolls outward (inversion sprain) or inward (eversion sprain).      What are the causes?    This condition is caused by rolling or twisting the ankle.      What increases the risk?    You are more likely to develop this condition if you play sports.      What are the signs or symptoms?    Symptoms of this condition include:  •Pain in your ankle.       •Swelling.       •Bruising. This may happen right after you sprain your ankle or 1–2 days later.      •Trouble standing or walking.        How is this diagnosed?    This condition is diagnosed with:  •A physical exam. During the exam, your doctor will press on certain parts of your foot and ankle and try to move them in certain ways.      •X-ray imaging. These may be taken to see how bad the sprain is and to check for broken bones.        How is this treated?    This condition may be treated with:  •A brace or splint. This is used to keep the ankle from moving until it heals.      •An elastic bandage. This is used to support the ankle.      •Crutches.      •Pain medicine.      •Surgery. This may be needed if the sprain is very bad.      •Physical therapy. This may help to improve movement in the ankle.        Follow these instructions at home:    If you have a brace or a splint:     •Wear the brace or splint as told by your doctor. Remove it only as told by your doctor.    •Loosen the brace or splint if your toes:  •Tingle.       •Lose feeling (become numb).      •Turn cold and blue.        •Keep the brace or splint clean.    •If the brace or splint is not waterproof:  •Do not let it get wet.      •Cover it with a watertight covering when you take a bath or a shower.        If you have an elastic bandage (dressing):     •Remove it to shower or bathe.       •Try not to move your ankle much, but wiggle your toes from time to time. This helps to prevent swelling.       •Adjust the dressing if it feels too tight.    •Loosen the dressing if your foot:   •Loses feeling.      •Tingles.      •Becomes cold and blue.          Managing pain, stiffness, and swelling   Bag of ice on a towel on the skin.   •Take over-the-counter and prescription medicines only as told by doctor.      •For 2–3 days, keep your ankle raised (elevated) above the level of your heart.    •If told, put ice on the injured area:  •If you have a removable brace or splint, remove it as told by your doctor.      •Put ice in a plastic bag.       •Place a towel between your skin and the bag.       •Leave the ice on for 20 minutes, 2–3 times a day.        General instructions     •Rest your ankle.      • Do not use your injured leg to support your body weight until your doctor says that you can. Use crutches as told by your doctor.      • Do not use any products that contain nicotine or tobacco, such as cigarettes, e-cigarettes, and chewing tobacco. If you need help quitting, ask your doctor.      •Keep all follow-up visits as told by your doctor.        Contact a doctor if:    •Your bruises or swelling are quickly getting worse.      •Your pain does not get better after you take medicine.        Get help right away if:    •You cannot feel your toes or foot.      •Your foot or toes look blue.      •You have very bad pain that gets worse.        Summary    •An ankle sprain is a stretch or tear in one of the tough tissues (ligaments) that connect the bones in your ankle.      •This condition is caused by rolling or twisting the ankle.      •Symptoms include pain, swelling, bruising, and trouble walking.      •To help with pain and swelling, put ice on the injured ankle, raise your ankle above the level of your heart, and use an elastic bandage. Also, rest as told by your doctor.      •Keep all follow-up visits as told by your doctor. This is important.      This information is not intended to replace advice given to you by your health care provider. Make sure you discuss any questions you have with your health care provider.

## 2022-08-09 DIAGNOSIS — M25.571 PAIN IN RIGHT ANKLE AND JOINTS OF RIGHT FOOT: ICD-10-CM
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
## 2022-08-09 DIAGNOSIS — I77.6 ARTERITIS, UNSPECIFIED: ICD-10-CM

## 2022-08-09 DIAGNOSIS — Z88.2 ALLERGY STATUS TO SULFONAMIDES: ICD-10-CM

## 2022-08-09 DIAGNOSIS — Z20.822 CONTACT WITH AND (SUSPECTED) EXPOSURE TO COVID-19: ICD-10-CM
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
## 2022-11-30 NOTE — ED ADULT TRIAGE NOTE - DIRECT TO ROOM CARE INITIATED:
29-Jan-2017 18:16 Doxycycline Pregnancy And Lactation Text: This medication is Pregnancy Category D and not consider safe during pregnancy. It is also excreted in breast milk but is considered safe for shorter treatment courses.

## 2023-03-29 NOTE — ED ADULT TRIAGE NOTE - AS HEIGHT TYPE
stated Detail Level: Zone Include Location In Plan?: No Additional Notes (Optional): Area is well healed and no further treatment is needed Detail Level: Detailed

## 2024-02-24 NOTE — ED ADULT TRIAGE NOTE - BMI (KG/M2)
Patient was bit to right hand by own dog today at 1000.  Cleaned wound after the bite occurred.  Last Tdap was 5/20/2016.  Has two bite marks to top of right hand.      Visit Vitals  /80 (BP Location: LUE - Left upper extremity, Patient Position: Sitting, Cuff Size: Regular)   Pulse (!) 60   Temp 98 °F (36.7 °C) (Tympanic)   Wt 72.5 kg (159 lb 14.4 oz)   SpO2 97%   BMI 21.69 kg/m²       51.4

## 2025-04-15 NOTE — ED PROVIDER NOTE - GASTROINTESTINAL, MLM
April 15, 2025    To Whom It May Concern:         This is confirmation that Nikole Wright attended her scheduled appointment with Stephane Perkins M.D. on 4/15/25.  Please excuse her from any days missed from school this week.  She is recovering from an acute illness and anticipated to be well enough to return by 4/18/2025.  She may return sooner if feeling better more quickly than anticipated.         If you have any questions please do not hesitate to call me at the phone number listed below.    Sincerely,          Stephane Perkins M.D.  [phone]                   Abdomen soft, non-tender, no guarding.